# Patient Record
Sex: FEMALE | Race: WHITE | URBAN - METROPOLITAN AREA
[De-identification: names, ages, dates, MRNs, and addresses within clinical notes are randomized per-mention and may not be internally consistent; named-entity substitution may affect disease eponyms.]

---

## 2019-05-30 ENCOUNTER — THERAPY VISIT (OUTPATIENT)
Dept: PHYSICAL THERAPY | Facility: CLINIC | Age: 39
End: 2019-05-30
Payer: COMMERCIAL

## 2019-05-30 DIAGNOSIS — G89.29 CHRONIC BILATERAL LOW BACK PAIN WITH LEFT-SIDED SCIATICA: ICD-10-CM

## 2019-05-30 DIAGNOSIS — M54.42 CHRONIC BILATERAL LOW BACK PAIN WITH LEFT-SIDED SCIATICA: ICD-10-CM

## 2019-05-30 PROCEDURE — 97161 PT EVAL LOW COMPLEX 20 MIN: CPT | Mod: GP | Performed by: PHYSICAL THERAPIST

## 2019-05-30 PROCEDURE — 97110 THERAPEUTIC EXERCISES: CPT | Mod: GP | Performed by: PHYSICAL THERAPIST

## 2019-05-30 NOTE — PROGRESS NOTES
[facility] Initial Evaluation  Subjective:  Dariela saw Osiris Guidry PA-C on 4/25/19 [facility] c/o LBP.  She was referred to PT for evaluation and treatment.  Dariela reports that her pain started about 1.5 yrs ago after a pregnancy.  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  Chief complaints at this time: pain with lying flat on back, moving legs when in supine, pain with prolonged sitting, prolonged standing, sometimes pain with lifting and carrying.  Has been to chiro in the past and still goes 3x/week without relief.      The history is provided by the patient. A  was used.   Dariela Stroud is a 39 year old female with a lumbar condition.  Condition occurred with:  Insidious onset (related to 3rd pregnancy).  Condition occurred: for unknown reasons.  This is a chronic condition     Patient reports pain:  Central lumbar spine, lumbar spine left and lumbar spine right.  Radiates to:  No radiation.  Pain is described as aching and is intermittent and reported as 3/10.  Associated symptoms:  Loss of motion/stiffness. Pain is worse in the P.M..  Symptoms are exacerbated by standing, sitting, lying down, certain positions, bending, carrying and lifting and relieved by NSAID's.  Since onset symptoms are unchanged.    Previous treatment includes chiropractic.  There was no improvement following previous treatment.  General health as reported by patient is good.  Pertinent medical history includes:  Overweight.  Medical allergies: yes (latex).  Other surgeries include:  Other (2 c-sections, cholesyctectomy).  Medication history: lexapro.  Current occupation is IT.  Patient is working in normal job without restrictions.  Primary job tasks include:  Prolonged sitting (computer work).    Barriers include:  None as reported by the patient.    Red flags:  None as reported by the patient.                        Objective:  System         Lumbar/SI  Evaluation    Lumbar Myotomes:  not assessed (pt denies LE weakness)            Lumbar DTR's:  not assessed        Lumbar Dermtomes:  not assessed (pt education regarding ok level of activity, ongoing POC, isolation of variables for therapies, centralization vs peripheralization, expectations for changes in pain)                                                                         [facility] Lumbar Evaluation    Posture:  Sitting: fair  Standing: fair  Lordosis: Accentuated  Lateral Shift: no  Correction of Posture: no effect  Other Observations: increase in central LBP with pressups, however abolished L gluteal sx  Movement Loss:  Flexion (Flex): mod and pain  Extension (EXT): mod and pain  Side [place] R (SG R): min and pain  Side [place] L (SG L): min and pain  Test Movements:        EIL: During: decreases  After: no better  Mechanical Response: no effect  Repeat EIL: During: decreases and centralizing  After: better  Mechanical Response: IncROM        Conclusion: derangement  Principle of Treatment:  Posture Correction: lumbar roll in sitting, instruct for use in car, work home, discussed creation and varying size depending on chair    Extension: REIL x10-20, every 2-3 hours or as symptoms dictate    Other: pt education regarding ok level of activity, ongoing POC, isolation of variables for therapies, centralization vs peripheralization, expectations for changes in pain                                   Musculoskeletal:        Back:        ROS    Assessment/Plan:    Patient is a 39 year old female with lumbar complaints.    Patient has the following significant findings with corresponding treatment plan.                Diagnosis 1:  B LBP with L sciatica  Pain -  hot/cold therapy, manual therapy, self management, education, directional preference exercise and home program  Decreased ROM/flexibility - manual therapy, therapeutic exercise and home program  Decreased joint mobility - manual therapy, therapeutic  exercise and home program  Decreased function - therapeutic activities and home program  Impaired posture - neuro re-education and home program    Therapy Evaluation Codes:   1) History comprised of:   Personal factors that impact the plan of care:      None.    Comorbidity factors that impact the plan of care are:      Overweight.     Medications impacting care: None.  2) Examination of Body Systems comprised of:   Body structures and functions that impact the plan of care:      Lumbar spine.   Activity limitations that impact the plan of care are:      Bathing, Bending, Dressing, Lifting, Sitting, Squatting/kneeling and Standing.  3) Clinical presentation characteristics are:   Stable/Uncomplicated.  4) Decision-Making    Low complexity using standardized patient assessment instrument and/or measureable assessment of functional outcome.  Cumulative Therapy Evaluation is: Low complexity.    Previous and current functional limitations:  (See Goal Flow Sheet for this information)    Short term and Long term goals: (See Goal Flow Sheet for this information)     Communication ability:  Patient appears to be able to clearly communicate and understand verbal and written communication and follow directions correctly.  Treatment Explanation - The following has been discussed with the patient:   RX ordered/plan of care  Anticipated outcomes  Possible risks and side effects  This patient would benefit from PT intervention to resume normal activities.   Rehab potential is good.    Frequency:  1 X week, once daily  Duration:  for 6 weeks  Discharge Plan:  Achieve all LTG.  Independent in home treatment program.  Reach maximal therapeutic benefit.    Please refer to the daily flowsheet for treatment today, total treatment time and time spent performing 1:1 timed codes.

## 2019-05-30 NOTE — LETTER
[facility]  [address]  [place] [id number] [phone]    May 31, 2019    Re: Dariela Stroud   :   1980  MRN:  [id number]   REFERRING PHYSICIAN:   REID Celestin    [facility]    Date of Initial Evaluation:  2019  Visits:  Rxs Used: 1  Reason for Referral:  Chronic bilateral low back pain with left-sided sciatica    EVALUATION SUMMARY    [facility]  Subjective:  Dariela saw Osiris Guidry PA-C on 19 wt c/o LBP.  She was referred to PT for evaluation and treatment.  Dariela reports that her pain started about 1.5 yrs ago after a pregnancy.  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  Chief complaints at this time: pain with lying flat on back, moving legs when in supine, pain with prolonged sitting, prolonged standing, sometimes pain with lifting and carrying.  Has been to chiro in the past and still goes 3x/week without relief.      The history is provided by the patient. A  was used.   Dariela Stroud is a 39 year old female with a lumbar condition.  Condition occurred with:  Insidious onset (related to 3rd pregnancy).  Condition occurred: for unknown reasons.  This is a chronic condition     Patient reports pain:  Central lumbar spine, lumbar spine left and lumbar spine right.  Radiates to:  No radiation.  Pain is described as aching and is intermittent and reported as 3/10.  Associated symptoms:  Loss of motion/stiffness. Pain is worse in the P.M..  Symptoms are exacerbated by standing, sitting, lying down, certain positions, bending, carrying and lifting and relieved by NSAID's.  Since onset symptoms are unchanged.    Previous treatment includes chiropractic.  There was no improvement following previous treatment.  General health as reported by patient is good.  Pertinent medical  history includes:  Overweight.  Medical allergies: yes (latex).  Other surgeries include:  Other (2 c-sections, cholesyctectomy).  Medication history: lexapro.  Current occupation is IT.  Patient is working in normal job without restrictions.  Primary job tasks include:  Prolonged sitting (computer work).  Barriers include:  None as reported by the patient.  Red flags:  None as reported by the patient.  Objective:    Lumbar/SI Evaluation  Lumbar Myotomes:  not assessed (pt denies LE weakness)  Lumbar DTR's:  not assessed  Lumbar Dermtomes:  not assessed (pt education regarding ok level of activity, ongoing POC, isolation of variables for therapies, centralization vs peripheralization, expectations for changes in pain)        [facility] Lumbar Evaluation  Posture:  Sitting: fair  Standing: fair  Lordosis: Accentuated  Lateral Shift: no  Correction of Posture: no effect  Other Observations: increase in central LBP with pressups, however abolished L gluteal sx  Movement Loss:  Flexion (Flex): mod and pain  Extension (EXT): mod and pain  Side [place] R (SG R): min and pain  Side [place] L (SG L): min and pain  Test Movements:  EIL: During: decreases  After: no better  Mechanical Response: no effect  Repeat EIL: During: decreases and centralizing  After: better  Mechanical Response: IncROM  Conclusion: derangement  Principle of Treatment:  Posture Correction: lumbar roll in sitting, instruct for use in car, work home, discussed creation and varying size depending on chair  Extension: REIL x10-20, every 2-3 hours or as symptoms dictate  Other: pt education regarding ok level of activity, ongoing POC, isolation of variables for therapies, centralization vs peripheralization, expectations for changes in pain                                  Musculoskeletal:        Back:        ROS    Assessment/Plan:    Patient is a 39 year old female with lumbar complaints.    Patient has the following significant findings with corresponding  treatment plan.                Diagnosis 1:  B LBP with L sciatica  Pain -  hot/cold therapy, manual therapy, self management, education, directional preference exercise and home program  Decreased ROM/flexibility - manual therapy, therapeutic exercise and home program  Decreased joint mobility - manual therapy, therapeutic exercise and home program  Decreased function - therapeutic activities and home program  Impaired posture - neuro re-education and home program    Therapy Evaluation Codes:   1) History comprised of:   Personal factors that impact the plan of care:      None.    Comorbidity factors that impact the plan of care are:      Overweight.     Medications impacting care: None.  2) Examination of Body Systems comprised of:   Body structures and functions that impact the plan of care:      Lumbar spine.   Activity limitations that impact the plan of care are:      Bathing, Bending, Dressing, Lifting, Sitting, Squatting/kneeling and Standing.  3) Clinical presentation characteristics are:   Stable/Uncomplicated.  4) Decision-Making    Low complexity using standardized patient assessment instrument and/or measureable assessment of functional outcome.  Cumulative Therapy Evaluation is: Low complexity.    Previous and current functional limitations:  (See Goal Flow Sheet for this information)    Short term and Long term goals: (See Goal Flow Sheet for this information)     Communication ability:  Patient appears to be able to clearly communicate and understand verbal and written communication and follow directions correctly.  Treatment Explanation - The following has been discussed with the patient:   RX ordered/plan of care  Anticipated outcomes  Possible risks and side effects  This patient would benefit from PT intervention to resume normal activities.   Rehab potential is good.    Frequency:  1 X week, once daily  Duration:  for 6 weeks  Discharge Plan:  Achieve all LTG.  Independent in home treatment  program.  Reach maximal therapeutic benefit.      Thank you for your referral.    INQUIRIES  Therapist: Lea Gunn DPT  [facility] PHYSICAL THERAPY  [address]  [place] [address]  Phone: [phone]  Fax: [phone]

## 2019-06-10 ENCOUNTER — THERAPY VISIT (OUTPATIENT)
Dept: PHYSICAL THERAPY | Facility: CLINIC | Age: 39
End: 2019-06-10
Payer: COMMERCIAL

## 2019-06-10 DIAGNOSIS — M54.42 CHRONIC BILATERAL LOW BACK PAIN WITH LEFT-SIDED SCIATICA: ICD-10-CM

## 2019-06-10 DIAGNOSIS — G89.29 CHRONIC BILATERAL LOW BACK PAIN WITH LEFT-SIDED SCIATICA: ICD-10-CM

## 2019-06-10 PROCEDURE — 97530 THERAPEUTIC ACTIVITIES: CPT | Mod: GP | Performed by: PHYSICAL THERAPIST

## 2019-06-10 PROCEDURE — 97110 THERAPEUTIC EXERCISES: CPT | Mod: GP | Performed by: PHYSICAL THERAPIST

## 2019-06-10 NOTE — PROGRESS NOTES
Subjective:  HPI                    Objective:  System    Physical Exam      [facility] Lumbar Evaluation      Movement Loss:  Flexion (Flex): pain  Extension (EXT): min, mod and pain  Side [place] R (SG R): mod and pain  Side [place] L (SG L): pain and min                                               ROS    Assessment/Plan:    SUBJECTIVE  Subjective changes as noted by pt:  Pt reports that she's performing the pressups 3-4x/day. Notices pain worsens for about an hour.       Current pain level: 2/10     Changes in function:  None     Adverse reaction to treatment or activity:  None    OBJECTIVE  Changes in objective findings:  Yes, See physical exam section and/or daily flowsheet for response to repeated movements.           ASSESSMENT  Dariela continues to require intervention to meet STG and LTG's: PT  No change of symptoms has been noted.  Response to therapy has shown lack of progress in  pain level  Progress made towards STG/LTG?  None    PLAN  Continue current treatment plan until patient demonstrates readiness to progress to higher level exercises.    PTA/ATC plan:  N/A    Please refer to the daily flowsheet for treatment today, total treatment time and time spent performing 1:1 timed codes.

## 2019-08-15 PROBLEM — G89.29 CHRONIC BILATERAL LOW BACK PAIN WITH LEFT-SIDED SCIATICA: Status: RESOLVED | Noted: 2019-05-30 | Resolved: 2019-08-15

## 2019-08-15 PROBLEM — M54.42 CHRONIC BILATERAL LOW BACK PAIN WITH LEFT-SIDED SCIATICA: Status: RESOLVED | Noted: 2019-05-30 | Resolved: 2019-08-15

## 2019-08-15 NOTE — PROGRESS NOTES
DISCHARGE SUMMARY    Dariela Stroud was seen 2 times for evaluation and treatment.  Patient did not return for further treatment and current status is unknown.  Due to short treatment duration, no objective or functional changes were made.  Please see goal flow sheet from episode noted date below and initial evaluation for further information.  Patient is discharged from therapy and therapy episode is resolved as of 08/15/19.      No linked episodes